# Patient Record
Sex: FEMALE | Race: AMERICAN INDIAN OR ALASKA NATIVE | NOT HISPANIC OR LATINO | Employment: UNEMPLOYED | URBAN - METROPOLITAN AREA
[De-identification: names, ages, dates, MRNs, and addresses within clinical notes are randomized per-mention and may not be internally consistent; named-entity substitution may affect disease eponyms.]

---

## 2023-05-08 ENCOUNTER — OFFICE VISIT (OUTPATIENT)
Dept: OBSTETRICS AND GYNECOLOGY | Facility: CLINIC | Age: 38
End: 2023-05-08
Payer: MEDICAID

## 2023-05-08 VITALS
WEIGHT: 140 LBS | DIASTOLIC BLOOD PRESSURE: 68 MMHG | SYSTOLIC BLOOD PRESSURE: 112 MMHG | HEIGHT: 66 IN | BODY MASS INDEX: 22.5 KG/M2

## 2023-05-08 DIAGNOSIS — N87.0 DYSPLASIA OF CERVIX, LOW GRADE (CIN 1): ICD-10-CM

## 2023-05-08 DIAGNOSIS — Z12.4 PAP SMEAR FOR CERVICAL CANCER SCREENING: ICD-10-CM

## 2023-05-08 DIAGNOSIS — N87.1 DYSPLASIA OF CERVIX, HIGH GRADE CIN 2: ICD-10-CM

## 2023-05-08 DIAGNOSIS — D25.9 UTERINE LEIOMYOMA, UNSPECIFIED LOCATION: Primary | ICD-10-CM

## 2023-05-08 DIAGNOSIS — N92.0 MENORRHAGIA WITH REGULAR CYCLE: ICD-10-CM

## 2023-05-08 DIAGNOSIS — N63.15 MASS OVERLAPPING MULTIPLE QUADRANTS OF RIGHT BREAST: ICD-10-CM

## 2023-05-08 PROCEDURE — 1159F MED LIST DOCD IN RCRD: CPT | Mod: CPTII,S$GLB,, | Performed by: OBSTETRICS & GYNECOLOGY

## 2023-05-08 PROCEDURE — 99214 OFFICE O/P EST MOD 30 MIN: CPT | Mod: S$GLB,,, | Performed by: OBSTETRICS & GYNECOLOGY

## 2023-05-08 PROCEDURE — 1160F PR REVIEW ALL MEDS BY PRESCRIBER/CLIN PHARMACIST DOCUMENTED: ICD-10-PCS | Mod: CPTII,S$GLB,, | Performed by: OBSTETRICS & GYNECOLOGY

## 2023-05-08 PROCEDURE — 1160F RVW MEDS BY RX/DR IN RCRD: CPT | Mod: CPTII,S$GLB,, | Performed by: OBSTETRICS & GYNECOLOGY

## 2023-05-08 PROCEDURE — 1159F PR MEDICATION LIST DOCUMENTED IN MEDICAL RECORD: ICD-10-PCS | Mod: CPTII,S$GLB,, | Performed by: OBSTETRICS & GYNECOLOGY

## 2023-05-08 PROCEDURE — 88175 CYTOPATH C/V AUTO FLUID REDO: CPT | Performed by: OBSTETRICS & GYNECOLOGY

## 2023-05-08 PROCEDURE — 3078F PR MOST RECENT DIASTOLIC BLOOD PRESSURE < 80 MM HG: ICD-10-PCS | Mod: CPTII,S$GLB,, | Performed by: OBSTETRICS & GYNECOLOGY

## 2023-05-08 PROCEDURE — 3008F PR BODY MASS INDEX (BMI) DOCUMENTED: ICD-10-PCS | Mod: CPTII,S$GLB,, | Performed by: OBSTETRICS & GYNECOLOGY

## 2023-05-08 PROCEDURE — 99214 PR OFFICE/OUTPT VISIT, EST, LEVL IV, 30-39 MIN: ICD-10-PCS | Mod: S$GLB,,, | Performed by: OBSTETRICS & GYNECOLOGY

## 2023-05-08 PROCEDURE — 3008F BODY MASS INDEX DOCD: CPT | Mod: CPTII,S$GLB,, | Performed by: OBSTETRICS & GYNECOLOGY

## 2023-05-08 PROCEDURE — 3078F DIAST BP <80 MM HG: CPT | Mod: CPTII,S$GLB,, | Performed by: OBSTETRICS & GYNECOLOGY

## 2023-05-08 PROCEDURE — 3074F PR MOST RECENT SYSTOLIC BLOOD PRESSURE < 130 MM HG: ICD-10-PCS | Mod: CPTII,S$GLB,, | Performed by: OBSTETRICS & GYNECOLOGY

## 2023-05-08 PROCEDURE — 3074F SYST BP LT 130 MM HG: CPT | Mod: CPTII,S$GLB,, | Performed by: OBSTETRICS & GYNECOLOGY

## 2023-05-08 RX ORDER — TRIPROLIDINE/PSEUDOEPHEDRINE 2.5MG-60MG
TABLET ORAL EVERY 6 HOURS PRN
COMMUNITY

## 2023-05-08 RX ORDER — BISMUTH SUBSALICYLATE 262 MG/1
TABLET ORAL
COMMUNITY

## 2023-05-08 NOTE — PROGRESS NOTES
Mindi Catalan is a 37 y.o. female  presents with multiple physical complaints and a long medical history.  Primarily she is here today because she was seen in the ED recently and was told that she has uterine fibroids by CT scan..  LMP: Patient's last menstrual period was 04/15/2023..    In the past, she is been told she has endometriosis and ovarian cysts as well.  She is currently celibate and requires no birth control  In addition, she complains of a right breast mass, just lateral to her right nipple.  She states that it has been there for several weeks and is getting much smaller.  It is slightly tender    Past Medical History:   Diagnosis Date    Abnormal Pap smear of cervix     cryo for NATASHA 1-2    Abnormal uterine bleeding     Anxiety 2010    Depression Most my life    Dyspareunia Last 5 years or so    Endometriosis, unspecified     Fibroid Last month    Heart murmur     Hormone disorder     Cant take horomones    Orthostatic hypotension     Palpitations      Past Surgical History:   Procedure Laterality Date    ABDOMINAL SURGERY      Hernia at birth, c section , and gal bladder 5 years ago     SECTION      CHOLECYSTECTOMY      GYNECOLOGIC CRYOSURGERY  2011    HERNIA REPAIR      as child     Social History     Socioeconomic History    Marital status: Single   Tobacco Use    Smoking status: Every Day     Packs/day: 1.00     Years: 27.00     Pack years: 27.00     Types: Cigarettes    Smokeless tobacco: Never    Tobacco comments:     Keep trying to quit to no avail   Substance and Sexual Activity    Alcohol use: Never    Drug use: Never    Sexual activity: Not Currently     Partners: Male     Birth control/protection: Abstinence, Condom     Comment: Abstinence because of health reasons     Family History   Problem Relation Age of Onset    Heart failure Paternal Grandfather     Stroke Paternal Grandfather     Heart failure Paternal Grandmother     Stroke Paternal Grandmother     Diabetes  "Maternal Grandmother     Stroke Maternal Grandmother     Stroke Maternal Grandfather     Hypertension Father     Diabetes Father     Diabetes Mother     Rheum arthritis Mother     Migraines Mother     Thyroid disease Mother     Asthma Brother     Seizures Brother     Cancer Maternal Uncle     Colon cancer Maternal Uncle     Cancer Other     Breast cancer Neg Hx     Ovarian cancer Neg Hx     Uterine cancer Neg Hx      OB History          5    Para   1    Term   1            AB   4    Living   1         SAB   4    IAB        Ectopic        Multiple        Live Births   1                 /68 (BP Location: Left arm, Patient Position: Sitting)   Ht 5' 6" (1.676 m)   Wt 63.5 kg (140 lb)   LMP 04/15/2023   BMI 22.60 kg/m²     ROS:  GENERAL: Denies weight gain or weight loss. Feeling well overall.   SKIN: Denies rash or lesions.   HEAD: Denies head injury or headache.   NODES: Denies enlarged lymph nodes.   CHEST: Denies chest pain or shortness of breath.   CARDIOVASCULAR: Denies palpitations or left sided chest pain.   ABDOMEN: No abdominal pain, constipation, diarrhea, nausea, vomiting or rectal bleeding.   URINARY: No frequency, dysuria, hematuria, or burning on urination.  REPRODUCTIVE: See HPI.   BREASTS: The patient performs breast self-examination and denies pain, lumps, or nipple discharge.   HEMATOLOGIC: No easy bruisability or excessive bleeding.   MUSCULOSKELETAL: Denies joint pain or swelling.   NEUROLOGIC: Denies syncope or weakness.   PSYCHIATRIC: Denies depression, anxiety or mood swings.    PHYSICAL EXAM:    APPEARANCE: Well nourished, well developed, in no acute distress.  AFFECT: WNL, alert and oriented x 3  SKIN: No acne or hirsutism  NECK: Neck symmetric without masses or thyromegaly  NODES: No inguinal, cervical, axillary, or femoral lymph node enlargement  CHEST: Good respiratory effect  ABDOMEN: Soft.  No tenderness or masses.  No hepatosplenomegaly.  No hernias.  BREASTS: "   Right breast:  Small mobile and slightly tender, 0.5 cm nodule at 9:00 a.m., just lateral to the right nipple  Left breast:  Without changes, masses, or other findings  PELVIC:    V/v normal, without discharge or lesions no prolapse  Cervix clear, Pap done  Uterus 8 week size, irregular  Adnexa without mass      ICD-10-CM ICD-9-CM    1. Uterine leiomyoma, unspecified location  D25.9 218.9       2. Pap smear for cervical cancer screening  Z12.4 V76.2 Liquid-Based Pap Smear, Screening      3. Dysplasia of cervix, low grade (NATASHA 1)  N87.0 622.11 Liquid-Based Pap Smear, Screening      4. Dysplasia of cervix, high grade NATASHA 2  N87.1 622.12 Liquid-Based Pap Smear, Screening        Assessment and plan:    1. History of cryo for NATASHA, Pap done  2. History of fibroid uterus, and uterus enlarged on exam.  Ultrasound ordered  3. Menorrhagia and dysmenorrhea  She states that she has had this for many years  She is a smoker over the age of 35 so no birth control pills, although she states birth control pills have not helped in the past  She also tried Depo-Provera which did not help either.  She declines any hormonal treatment at this time  Await ultrasound results and check CBC  4. Right breast mass  Mammogram and right breast ultrasound ordered  Return for follow-up after imaging, regardless of results  Answers submitted by the patient for this visit:  Gynecologic Exam Questionnaire  (Submitted on 5/4/2023)  Chief Complaint: Gynecologic exam  genital itching: No  genital lesions: No  genital odor: Yes  genital rash: No  missed menses: No  pelvic pain: Yes  vaginal bleeding: Yes  vaginal discharge: Yes  Chronicity: chronic  Onset: more than 1 year ago  Frequency: constantly  Progression since onset: gradually worsening  Pain severity: severe  Affected side: both  Pregnant now?: No  abdominal pain: Yes  anorexia: No  back pain: Yes  chills: No  constipation: Yes  diarrhea: No  discolored urine: No  dysuria: No  fever:  No  flank pain: Yes  frequency: Yes  headaches: Yes  hematuria: No  nausea: Yes  painful intercourse: Yes  rash: No  urgency: No  vomiting: Yes  Please select the characteristics of your discharge: : copious, milky, watery, white  Vaginal bleeding: heavier than menses  Passing clots?: Yes  Passing tissue?: Yes  Aggravated by: activity, bowel movements, heavy lifting, intercourse, tactile pressure  treatments tried: acetaminophen, anti-fungal cream, heating pad, NSAIDs, warm bath  Improvement on treatment: mild  Sexual activity: sexually active  Partner with STD symptoms: no  Birth control: condoms  Menstrual history: regular  STD: No  abdominal surgery: Yes   section: Yes  Ectopic pregnancy: No  Endometriosis: Yes  herpes simplex: No  gynecological surgery: No  menorrhagia: Yes  metrorrhagia: Yes  miscarriage: Yes  ovarian cysts: Yes  perineal abscess: No  PID: No  terminated pregnancy: No

## 2023-05-16 LAB
FINAL PATHOLOGIC DIAGNOSIS: NORMAL
Lab: NORMAL

## 2023-05-17 ENCOUNTER — TELEPHONE (OUTPATIENT)
Dept: OBSTETRICS AND GYNECOLOGY | Facility: CLINIC | Age: 38
End: 2023-05-17
Payer: MEDICAID

## 2023-05-17 NOTE — TELEPHONE ENCOUNTER
----- Message from Diane Olguin sent at 5/17/2023 11:32 AM CDT -----  Regarding: results  Name of Who is Calling: CECE MIRANDA [[id number]]        What is the request in detail: pt is calling to get results explained, please advise.         Can the clinic reply by [facility]: no           What [facility] Back [facility] [phone]